# Patient Record
Sex: FEMALE | Race: WHITE | NOT HISPANIC OR LATINO | URBAN - METROPOLITAN AREA
[De-identification: names, ages, dates, MRNs, and addresses within clinical notes are randomized per-mention and may not be internally consistent; named-entity substitution may affect disease eponyms.]

---

## 2021-04-20 ENCOUNTER — OFFICE VISIT - RIVER FALLS (OUTPATIENT)
Dept: FAMILY MEDICINE | Facility: CLINIC | Age: 41
End: 2021-04-20

## 2021-04-20 ASSESSMENT — MIFFLIN-ST. JEOR: SCORE: 1642.81

## 2022-02-12 VITALS — HEIGHT: 66 IN | BODY MASS INDEX: 33.91 KG/M2 | WEIGHT: 211 LBS

## 2022-02-15 NOTE — NURSING NOTE
Quick Intake Entered On:  4/24/2021 12:33 PM CDT    Performed On:  4/20/2021 12:32 PM CDT by Estrella Smith               Summary   Weight Measured :   211 lb(Converted to: 211 lb 0 oz, 95.708 kg)    Height Measured :   66.25 in(Converted to: 5 ft 6 in, 168.27 cm)    Body Mass Index :   33.8 kg/m2 (HI)    Body Surface Area :   2.11 m2   Estrella Smith - 4/24/2021 12:32 PM CDT

## 2022-02-15 NOTE — PROGRESS NOTES
Patient:   BIGG GLEASON            MRN: [id number]            FIN: [id number]               Age:   41 years     Sex:  Female     :  1980   Associated Diagnoses:   Obesity, unspecified; Polycystic ovarian syndrome   Author:   Estrella Smith      Visit Information   Visit type:  Medical Nutrition Therapy.    Referral source:  Paul HADDAD, Dr. Sil Espinosa  [facility].       Chief Complaint   Obesity, PCOS      Interval History   Pt would like to work on weight loss but is very hesitant about cutting calories.  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    Pt avoids caffeine, alcohol, artificial sweeteners, most added sugars   Has recorded intake in the past and has 1700 calories on the low end.    am 2 eggs, homemade vegetable soup, olive oil, walnuts, beans   afternoon chicken salad or hamburger, salad, beans  will eat some berries for supper  evening protein, potato, vegetable    Exercise: no additional exercise routine, owns horses          Health Status   Allergies:    Allergic Reactions (Selected)  Severity Not Documented  Ceclor (Rash and hives)  Glutens (Digestive system)  Penicillin (Hives and rash)   Problem list:    All Problems  Thrombocytopenia, unspecified / SNOMED CT 327150338 / Confirmed  Shortness of breath / SNOMED CT 576966904 / Confirmed  Polycystic ovarian syndrome / SNOMED CT 828568021 / Confirmed  Obesity, unspecified / SNOMED CT 8709038661 / Confirmed  Mild intermittent asthma, uncomplicated / SNOMED CT 7580080786 / Confirmed  Depression / SNOMED CT 20202913 / Confirmed      Histories   Past Medical History:    Active  Mild intermittent asthma, uncomplicated ([id number])  Depression (71269868)  Shortness of breath (956522466)  Polycystic ovarian syndrome (606597501)  Obesity, unspecified (7946066897)  Thrombocytopenia, unspecified (353104709)   Family History:    Hypothyroid  Mother  Heart disease  Father     Procedure history:    Repair of tendon (SNOMED CT 439881814) in  2004 at 24 Years.  Comments:  4/13/2021 9:35 AM CDT - Ramona Ellis  Right ankle.   Social History:        Electronic Cigarette/Vaping Assessment            Electronic Cigarette Use: Never.      Alcohol Assessment: Denies Alcohol Use            Never      Tobacco Assessment: Past            Former smoker, quit more than 30 days ago, Cigarettes      Home and Environment Assessment            Marital status: .  Spouse/Partner name: Los Chavira.      Nutrition and Health Assessment            Type of diet: Special.      Exercise and Physical Activity Assessment            Exercise frequency: 5-6 times/week.      Sexual Assessment            Sexually active: Yes.  Identifies as female, Sexual orientation: Straight or heterosexual.  Contraceptive Use               Details: None.        Impression and Plan   Diagnosis     Obesity, unspecified ([id number] E66.9).     Polycystic ovarian syndrome ([id number]-JQ E28.2).         Today patient was instructed on the importance of how nutrition and physical activity can impact weight status and improve overall health conditioning including improving self-esteem and increasing energy level.  We discussed why it is important to incorporate healthy lifestyle interventions to control overall health to prevent complications of being obese including the potential of developing diabetes and preventing heart disease.   We discussed the food plate method.  Patient is recommended to limit calories to 1250 - 1500 per day.  We discussed how to read food labels.  Patient is shown appropriate portion sizes of a variety of foods.   We discussed how calories in should be less than calories expended to achieve optimal weight loss.  Discussed option for recommendation to start metformin for PCOS/ ovulation induction but pt does not want this.       Hunger/ Fullness cues - use the chart to help identify how pt is feeling before, during, and after eating   Smart Snacking and 20 Ways to eat more  fruit and vegetables.    Mindful eating - listening to body vs eating out of stress, boredom, emotion, eating to fuel body for strength and energy   Weight loss tips  Nutritional Psychiatry Your Brain on Food - discussion on how what you eat affects microbiome/ hormones and how you feel physically and emotionally.   Tracking food - patient is given reference to sparkpeople.com/ [employer] or apps and encouraged to track daily intake of food and fluids   Provided resources for cooking/ recipe websites  Importance of daily physical activity above work schedule to promote endorphin release    reduce stress, anxiety, and depression, improve sleep, increase energy level, improve muscle tone and strength ...  recommendation to incorporate physical activity at least 5 days per week with minimum of 30-60 minutes of moderately intense activity to promote weight loss.      Goals:  1250 calorie/ day for weight loss~ 1# per week, 1500 calorie ~ 1-2# weight loss per month   Record intake  Sleep 7-8 hours/ night  Physical activity of 45- 60 min/ day about work routine       Professional Services   Time spent with pt 45 min   cc Dr. Miller   cc Dr. Mujica